# Patient Record
Sex: FEMALE | Race: ASIAN
[De-identification: names, ages, dates, MRNs, and addresses within clinical notes are randomized per-mention and may not be internally consistent; named-entity substitution may affect disease eponyms.]

---

## 2018-06-01 ENCOUNTER — HOSPITAL ENCOUNTER (EMERGENCY)
Dept: HOSPITAL 76 - ED | Age: 53
Discharge: HOME | End: 2018-06-01
Payer: COMMERCIAL

## 2018-06-01 VITALS — DIASTOLIC BLOOD PRESSURE: 84 MMHG | SYSTOLIC BLOOD PRESSURE: 128 MMHG

## 2018-06-01 DIAGNOSIS — R73.9: Primary | ICD-10-CM

## 2018-06-01 DIAGNOSIS — I10: ICD-10-CM

## 2018-06-01 DIAGNOSIS — N30.00: ICD-10-CM

## 2018-06-01 LAB
ALBUMIN DIAFP-MCNC: 4.2 G/DL (ref 3.2–5.5)
ALBUMIN/GLOB SERPL: 1.1 {RATIO} (ref 1–2.2)
ALP SERPL-CCNC: 86 IU/L (ref 42–121)
ALT SERPL W P-5'-P-CCNC: 30 IU/L (ref 10–60)
ANION GAP SERPL CALCULATED.4IONS-SCNC: 8 MMOL/L (ref 6–13)
AST SERPL W P-5'-P-CCNC: 32 IU/L (ref 10–42)
BASOPHILS NFR BLD AUTO: 0 10^3/UL (ref 0–0.1)
BASOPHILS NFR BLD AUTO: 0.8 %
BILIRUB BLD-MCNC: 0.9 MG/DL (ref 0.2–1)
BUN SERPL-MCNC: 8 MG/DL (ref 6–20)
CALCIUM UR-MCNC: 8.9 MG/DL (ref 8.5–10.3)
CHLORIDE SERPL-SCNC: 100 MMOL/L (ref 101–111)
CLARITY UR REFRACT.AUTO: CLEAR
CO2 SERPL-SCNC: 26 MMOL/L (ref 21–32)
CREAT SERPLBLD-SCNC: 0.7 MG/DL (ref 0.4–1)
EOSINOPHIL # BLD AUTO: 0.1 10^3/UL (ref 0–0.7)
EOSINOPHIL NFR BLD AUTO: 2.1 %
ERYTHROCYTE [DISTWIDTH] IN BLOOD BY AUTOMATED COUNT: 15.8 % (ref 12–15)
EST. AVERAGE GLUCOSE BLD GHB EST-MCNC: 148 MG/DL (ref 70–100)
GFRSERPLBLD MDRD-ARVRAT: 88 ML/MIN/{1.73_M2} (ref 89–?)
GLOBULIN SER-MCNC: 3.8 G/DL (ref 2.1–4.2)
GLUCOSE SERPL-MCNC: 247 MG/DL (ref 70–100)
GLUCOSE UR QL STRIP.AUTO: NEGATIVE MG/DL
HB2 TOTAL: 13.5 G/DL
HBA1C BLD-MCNC: 0.68 G/DL
HEMOGLOBIN A1C %: 6.8 % (ref 4.6–6.2)
HGB UR QL STRIP: 11.9 G/DL (ref 12–16)
KETONES UR QL STRIP.AUTO: NEGATIVE MG/DL
LIPASE SERPL-CCNC: 34 U/L (ref 22–51)
LYMPHOCYTES # SPEC AUTO: 1.6 10^3/UL (ref 1.5–3.5)
LYMPHOCYTES NFR BLD AUTO: 33.6 %
MCH RBC QN AUTO: 21.1 PG (ref 27–31)
MCHC RBC AUTO-ENTMCNC: 31.8 G/DL (ref 32–36)
MCV RBC AUTO: 66.5 FL (ref 81–99)
MONOCYTES # BLD AUTO: 0.3 10^3/UL (ref 0–1)
MONOCYTES NFR BLD AUTO: 6.9 %
NEUTROPHILS # BLD AUTO: 2.7 10^3/UL (ref 1.5–6.6)
NEUTROPHILS # SNV AUTO: 4.8 X10^3/UL (ref 4.8–10.8)
NEUTROPHILS NFR BLD AUTO: 56.6 %
NITRITE UR QL STRIP.AUTO: NEGATIVE
PDW BLD AUTO: 7.6 FL (ref 7.9–10.8)
PH UR STRIP.AUTO: 6.5 PH (ref 5–7.5)
PLAT MORPH BLD: (no result)
PLATELET # BLD: 225 10^3/UL (ref 130–450)
PLATELET BLD QL SMEAR: (no result)
PROT SPEC-MCNC: 8 G/DL (ref 6.7–8.2)
PROT UR STRIP.AUTO-MCNC: NEGATIVE MG/DL
RBC # UR STRIP.AUTO: NEGATIVE /UL
RBC # URNS HPF: (no result) /HPF (ref 0–5)
RBC MAR: 5.63 10^6/UL (ref 4.2–5.4)
RBC MORPH BLD: (no result)
SODIUM SERPLBLD-SCNC: 134 MMOL/L (ref 135–145)
SP GR UR STRIP.AUTO: <=1.005 (ref 1–1.03)
SQUAMOUS URNS QL MICRO: (no result)
UROBILINOGEN UR QL STRIP.AUTO: (no result) E.U./DL
UROBILINOGEN UR STRIP.AUTO-MCNC: NEGATIVE MG/DL

## 2018-06-01 PROCEDURE — 36415 COLL VENOUS BLD VENIPUNCTURE: CPT

## 2018-06-01 PROCEDURE — 93005 ELECTROCARDIOGRAM TRACING: CPT

## 2018-06-01 PROCEDURE — 87086 URINE CULTURE/COLONY COUNT: CPT

## 2018-06-01 PROCEDURE — 83690 ASSAY OF LIPASE: CPT

## 2018-06-01 PROCEDURE — 96360 HYDRATION IV INFUSION INIT: CPT

## 2018-06-01 PROCEDURE — 99284 EMERGENCY DEPT VISIT MOD MDM: CPT

## 2018-06-01 PROCEDURE — 81001 URINALYSIS AUTO W/SCOPE: CPT

## 2018-06-01 PROCEDURE — 80053 COMPREHEN METABOLIC PANEL: CPT

## 2018-06-01 PROCEDURE — 85025 COMPLETE CBC W/AUTO DIFF WBC: CPT

## 2018-06-01 PROCEDURE — 81003 URINALYSIS AUTO W/O SCOPE: CPT

## 2018-06-01 PROCEDURE — 84484 ASSAY OF TROPONIN QUANT: CPT

## 2018-06-01 PROCEDURE — 83036 HEMOGLOBIN GLYCOSYLATED A1C: CPT

## 2018-06-01 NOTE — ED PHYSICIAN DOCUMENTATION
History of Present Illness





- Stated complaint


Stated Complaint: DIZZY





- Chief complaint


Chief Complaint: Cardiac





- History obtained from


History obtained from: Patient, Family





- History of Present Illness


Timing: Today


Pain level max: 0


Pain level now: 0


Improved by: rest


Worsened by: standing, walking





- Additonal information


Additional information: 





Patient is a 52-year-old female who presents to the emergency department 

stating she "just does not feel well".  States that she may have had a little 

bit of chest pain earlier today, but is not sure about this.  She states that 

she feels lightheaded like she is going to pass out when she stands up.  She 

denies any current chest pain.  No abdominal pain.  States that her legs feel 

weak when she walks.  She denies any vaginal bleeding.  States that she thinks 

once or twice she has seen a small amount of blood in her stool, but is unsure 

if this is happened recently or not.  She states that she does have a history 

of high blood pressure but has not seen a doctor for several years.  Currently 

does not take any medications at home.  States decreased appetite lately.





Review of Systems


Ten Systems: 10 systems reviewed and negative


Constitutional: denies: Fever, Chills


Ears: denies: Ear pain


Nose: denies: Rhinorrhea / runny nose, Congestion


Throat: denies: Sore throat


Respiratory: denies: Dyspnea, Cough, Wheezing


GI: denies: Abdominal Pain, Nausea, Vomiting, Diarrhea


Skin: denies: Rash


Musculoskeletal: denies: Neck pain, Back pain


Neurologic: denies: Headache





PD PAST MEDICAL HISTORY





- Past Medical History


Past Medical History: Yes


Cardiovascular: Hypertension





- Present Medications


Home Medications: 


 Ambulatory Orders











 Medication  Instructions  Recorded  Confirmed


 


Cephalexin [Keflex] 500 mg PO Q6H #20 capsule 06/01/18 


 


Ferrous Sulfate 325 mg PO DAILY #30 tablet 06/01/18 


 


Metformin HCl 500 mg PO BID #60 tablet 06/01/18 














- Allergies


Allergies/Adverse Reactions: 


 Allergies











Allergy/AdvReac Type Severity Reaction Status Date / Time


 


No Known Drug Allergies Allergy   Verified 06/01/18 13:23














PD ED PE NORMAL





- Vitals


Vital signs reviewed: Yes





- General


General: Alert and oriented X 3, No acute distress, Well developed/nourished





- HEENT


HEENT: PERRL, Other (dry lips)





- Neck


Neck: Supple, no meningeal sign





- Cardiac


Cardiac: RRR, Strong equal pulses





- Respiratory


Respiratory: No respiratory distress, Clear bilaterally





- Abdomen


Abdomen: Normal bowel sounds, Soft, Non tender, Non distended





- Derm


Derm: Warm and dry, No rash





- Extremities


Extremities: No edema, No calf tenderness / cord





- Neuro


Neuro: Alert and oriented X 3





- Psych


Psych: Normal mood, Normal affect





Results





- Vitals


Vitals: 


 Vital Signs - 24 hr











  06/01/18 06/01/18 06/01/18





  13:22 14:48 15:35


 


Temperature 36.6 C  36 C L


 


Heart Rate 98 84 84


 


Respiratory 18 14 16





Rate   


 


Blood Pressure 162/104 H 144/84 H 128/84 H


 


O2 Saturation 100 98 99








 Oxygen











O2 Source                      Room air

















- Labs


Labs: 


 Laboratory Tests











  06/01/18 06/01/18 06/01/18





  13:35 13:35 13:35


 


WBC  4.8  


 


RBC  5.63 H  


 


Hgb  11.9 L  


 


Hct  37.4  


 


MCV  66.5 L  


 


MCH  21.1 L  


 


MCHC  31.8 L  


 


RDW  15.8 H  


 


Plt Count  225  


 


MPV  7.6 L  


 


Neut # (Auto)  2.7  


 


Lymph # (Auto)  1.6  


 


Mono # (Auto)  0.3  


 


Eos # (Auto)  0.1  


 


Baso # (Auto)  0.0  


 


Absolute Nucleated RBC  0.00  


 


Nucleated RBC %  0.1  


 


Manual Slide Review  Indicated  


 


Platelet Estimate  NORMAL (130-450,000)  


 


Platelet Morphology  NORMAL APPEARANCE  


 


RBC Morph Micro Appear  1+ POIKILOCYTOSIS  


 


Sodium   134 L 


 


Potassium   3.3 L 


 


Chloride   100 L 


 


Carbon Dioxide   26 


 


Anion Gap   8.0 


 


BUN   8 


 


Creatinine   0.7 


 


Estimated GFR (MDRD)   88 L 


 


Glucose   247 H 


 


Glycated Hemoglobin   


 


Estim Average Glucose   


 


Calcium   8.9 


 


Total Bilirubin   0.9 


 


AST   32 


 


ALT   30 


 


Alkaline Phosphatase   86 


 


Troponin I    < 0.04


 


Total Protein   8.0 


 


Albumin   4.2 


 


Globulin   3.8 


 


Albumin/Globulin Ratio   1.1 


 


Lipase   34 


 


Urine Color   


 


Urine Clarity   


 


Urine pH   


 


Ur Specific Gravity   


 


Urine Protein   


 


Urine Glucose (UA)   


 


Urine Ketones   


 


Urine Occult Blood   


 


Urine Nitrite   


 


Urine Bilirubin   


 


Urine Urobilinogen   


 


Ur Leukocyte Esterase   


 


Urine RBC   


 


Urine WBC   


 


Ur Squamous Epith Cells   


 


Urine Bacteria   


 


Ur Microscopic Review   


 


Urine Culture Comments   














  06/01/18 06/01/18





  13:35 14:40


 


WBC  


 


RBC  


 


Hgb  


 


Hct  


 


MCV  


 


MCH  


 


MCHC  


 


RDW  


 


Plt Count  


 


MPV  


 


Neut # (Auto)  


 


Lymph # (Auto)  


 


Mono # (Auto)  


 


Eos # (Auto)  


 


Baso # (Auto)  


 


Absolute Nucleated RBC  


 


Nucleated RBC %  


 


Manual Slide Review  


 


Platelet Estimate  


 


Platelet Morphology  


 


RBC Morph Micro Appear  


 


Sodium  


 


Potassium  


 


Chloride  


 


Carbon Dioxide  


 


Anion Gap  


 


BUN  


 


Creatinine  


 


Estimated GFR (MDRD)  


 


Glucose  


 


Glycated Hemoglobin  6.8 H 


 


Estim Average Glucose  148 H 


 


Calcium  


 


Total Bilirubin  


 


AST  


 


ALT  


 


Alkaline Phosphatase  


 


Troponin I  


 


Total Protein  


 


Albumin  


 


Globulin  


 


Albumin/Globulin Ratio  


 


Lipase  


 


Urine Color   YELLOW


 


Urine Clarity   CLEAR


 


Urine pH   6.5


 


Ur Specific Gravity   <=1.005


 


Urine Protein   NEGATIVE


 


Urine Glucose (UA)   NEGATIVE


 


Urine Ketones   NEGATIVE


 


Urine Occult Blood   NEGATIVE


 


Urine Nitrite   NEGATIVE


 


Urine Bilirubin   NEGATIVE


 


Urine Urobilinogen   0.2 (NORMAL)


 


Ur Leukocyte Esterase   SMALL H


 


Urine RBC   0-5


 


Urine WBC   6-10 H


 


Ur Squamous Epith Cells   RARE Squamous


 


Urine Bacteria   None Seen


 


Ur Microscopic Review   INDICATED


 


Urine Culture Comments   INDICATED














PD MEDICAL DECISION MAKING





- ED course


Complexity details: reviewed results, re-evaluated patient, considered 

differential, d/w patient, d/w family


ED course: 





Patient is a 52-year-old female who presents to the emergency department the 

complaint of not feeling well.  Feels better after IV fluids.  Appears to be 

diabetic and will start her on metformin.  She has not been told this before.  

She also appears to have a microcytic anemia, likely iron deficiency and will 

start her on iron.  She also has high blood pressure, will have her follow-up 

with her doctor determine if she has hypertension.  She is well-appearing, 

nontoxic.  Afebrile.  Ambulating with a steady gait.  No evidence of stroke. 

Also appears to have a UTI will place on antibiotics for this.  Patient 

counseled regarding signs and symptoms for which I believe and urgent re-

evaluation would be necessary. Patient with good understanding of and agreement 

to plan and is comfortable going home at this time





This document was made in part using voice recognition software. While efforts 

are made to proofread this document, sound alike and grammatical errors may 

occur.





Departure





- Departure


Disposition: 01 Home, Self Care


Clinical Impression: 


 Hyperglycemia





UTI (urinary tract infection)


Qualifiers:


 Urinary tract infection type: acute cystitis Hematuria presence: without 

hematuria Qualified Code(s): N30.00 - Acute cystitis without hematuria





Condition: Good


Instructions:  ED UTI Cystitis Female, ED Hyperglycemia New Susp Diabetes


Follow-Up: 


Joss Hylton MD [Primary Care Provider] - Within 1 week


Prescriptions: 


Cephalexin [Keflex] 500 mg PO Q6H #20 capsule


Ferrous Sulfate 325 mg PO DAILY #30 tablet


Metformin HCl 500 mg PO BID #60 tablet


Comments: 


Your blood sugar is elevated today and I suspect that you have diabetes.  You 

also appear to have high blood pressure, but this should be rechecked with your 

doctor.  We will start you on medication to help control your blood sugar.  You 

are also likely iron deficient and we will start you on iron as well.  Return 

if you worsen.


Discharge Date/Time: 06/01/18 15:42

## 2018-07-02 ENCOUNTER — HOSPITAL ENCOUNTER (OUTPATIENT)
Dept: HOSPITAL 76 - LAB.WCP | Age: 53
Discharge: HOME | End: 2018-07-02
Attending: PHYSICIAN ASSISTANT
Payer: COMMERCIAL

## 2018-07-02 DIAGNOSIS — E03.9: Primary | ICD-10-CM

## 2018-07-02 LAB
T4 FREE SERPL-MCNC: 0.49 NG/DL (ref 0.58–1.64)
TSH SERPL-ACNC: 13.74 UIU/ML (ref 0.34–5.6)

## 2018-07-02 PROCEDURE — 84439 ASSAY OF FREE THYROXINE: CPT

## 2018-07-02 PROCEDURE — 84443 ASSAY THYROID STIM HORMONE: CPT

## 2018-07-02 PROCEDURE — 36415 COLL VENOUS BLD VENIPUNCTURE: CPT

## 2018-08-08 ENCOUNTER — HOSPITAL ENCOUNTER (OUTPATIENT)
Dept: HOSPITAL 76 - LAB.WCP | Age: 53
Discharge: HOME | End: 2018-08-08
Attending: PHYSICIAN ASSISTANT
Payer: COMMERCIAL

## 2018-08-08 ENCOUNTER — HOSPITAL ENCOUNTER (OUTPATIENT)
Dept: HOSPITAL 76 - DI | Age: 53
Discharge: HOME | End: 2018-08-08
Attending: PHYSICIAN ASSISTANT
Payer: COMMERCIAL

## 2018-08-08 DIAGNOSIS — R06.09: Primary | ICD-10-CM

## 2018-08-08 DIAGNOSIS — E11.9: ICD-10-CM

## 2018-08-08 DIAGNOSIS — I10: ICD-10-CM

## 2018-08-08 DIAGNOSIS — E03.9: Primary | ICD-10-CM

## 2018-08-08 LAB
T4 FREE SERPL-MCNC: 1.03 NG/DL (ref 0.58–1.64)
TSH SERPL-ACNC: 2.32 UIU/ML (ref 0.34–5.6)

## 2018-08-08 PROCEDURE — 36415 COLL VENOUS BLD VENIPUNCTURE: CPT

## 2018-08-08 PROCEDURE — 84439 ASSAY OF FREE THYROXINE: CPT

## 2018-08-08 PROCEDURE — 84443 ASSAY THYROID STIM HORMONE: CPT

## 2018-08-08 PROCEDURE — 93306 TTE W/DOPPLER COMPLETE: CPT

## 2019-03-29 ENCOUNTER — HOSPITAL ENCOUNTER (OUTPATIENT)
Dept: HOSPITAL 76 - LAB.WCP | Age: 54
Discharge: HOME | End: 2019-03-29
Attending: PHYSICIAN ASSISTANT
Payer: COMMERCIAL

## 2019-03-29 DIAGNOSIS — E03.9: ICD-10-CM

## 2019-03-29 DIAGNOSIS — I10: ICD-10-CM

## 2019-03-29 DIAGNOSIS — E11.9: Primary | ICD-10-CM

## 2019-03-29 LAB
ALBUMIN DIAFP-MCNC: 4.1 G/DL (ref 3.2–5.5)
ALBUMIN/GLOB SERPL: 1.4 {RATIO} (ref 1–2.2)
ALP SERPL-CCNC: 71 IU/L (ref 42–121)
ALT SERPL W P-5'-P-CCNC: 39 IU/L (ref 10–60)
ANION GAP SERPL CALCULATED.4IONS-SCNC: 8 MMOL/L (ref 6–13)
AST SERPL W P-5'-P-CCNC: 36 IU/L (ref 10–42)
BASOPHILS NFR BLD AUTO: 0 10^3/UL (ref 0–0.1)
BASOPHILS NFR BLD AUTO: 0.7 %
BILIRUB BLD-MCNC: 0.7 MG/DL (ref 0.2–1)
BUN SERPL-MCNC: 12 MG/DL (ref 6–20)
CALCIUM UR-MCNC: 9.4 MG/DL (ref 8.5–10.3)
CHLORIDE SERPL-SCNC: 102 MMOL/L (ref 101–111)
CO2 SERPL-SCNC: 28 MMOL/L (ref 21–32)
CREAT SERPLBLD-SCNC: 0.6 MG/DL (ref 0.4–1)
EOSINOPHIL # BLD AUTO: 0.1 10^3/UL (ref 0–0.7)
EOSINOPHIL NFR BLD AUTO: 2.6 %
ERYTHROCYTE [DISTWIDTH] IN BLOOD BY AUTOMATED COUNT: 15.9 % (ref 12–15)
EST. AVERAGE GLUCOSE BLD GHB EST-MCNC: 128 MG/DL (ref 70–100)
GFRSERPLBLD MDRD-ARVRAT: 105 ML/MIN/{1.73_M2} (ref 89–?)
GLOBULIN SER-MCNC: 3 G/DL (ref 2.1–4.2)
GLUCOSE SERPL-MCNC: 105 MG/DL (ref 70–100)
HB2 TOTAL: 12.6 G/DL
HBA1C BLD-MCNC: 0.54 G/DL
HEMOGLOBIN A1C %: 6.1 % (ref 4.6–6.2)
HGB UR QL STRIP: 11.5 G/DL (ref 12–16)
LYMPHOCYTES # SPEC AUTO: 1.4 10^3/UL (ref 1.5–3.5)
LYMPHOCYTES NFR BLD AUTO: 27.4 %
MCH RBC QN AUTO: 20.8 PG (ref 27–31)
MCHC RBC AUTO-ENTMCNC: 31.5 G/DL (ref 32–36)
MCV RBC AUTO: 66.2 FL (ref 81–99)
MONOCYTES # BLD AUTO: 0.4 10^3/UL (ref 0–1)
MONOCYTES NFR BLD AUTO: 7.8 %
NEUTROPHILS # BLD AUTO: 3 10^3/UL (ref 1.5–6.6)
NEUTROPHILS # SNV AUTO: 4.9 X10^3/UL (ref 4.8–10.8)
NEUTROPHILS NFR BLD AUTO: 61.5 %
PDW BLD AUTO: 8.2 FL (ref 7.9–10.8)
PLAT MORPH BLD: (no result)
PLATELET # BLD: 196 10^3/UL (ref 130–450)
PLATELET BLD QL SMEAR: (no result)
PROT SPEC-MCNC: 7.1 G/DL (ref 6.7–8.2)
RBC MAR: 5.51 10^6/UL (ref 4.2–5.4)
RBC MORPH BLD: (no result)
SODIUM SERPLBLD-SCNC: 138 MMOL/L (ref 135–145)
T4 FREE SERPL-MCNC: 1.15 NG/DL (ref 0.58–1.64)

## 2019-03-29 PROCEDURE — 84443 ASSAY THYROID STIM HORMONE: CPT

## 2019-03-29 PROCEDURE — 84439 ASSAY OF FREE THYROXINE: CPT

## 2019-03-29 PROCEDURE — 36415 COLL VENOUS BLD VENIPUNCTURE: CPT

## 2019-03-29 PROCEDURE — 80053 COMPREHEN METABOLIC PANEL: CPT

## 2019-03-29 PROCEDURE — 83036 HEMOGLOBIN GLYCOSYLATED A1C: CPT

## 2019-03-29 PROCEDURE — 85025 COMPLETE CBC W/AUTO DIFF WBC: CPT

## 2019-07-03 ENCOUNTER — HOSPITAL ENCOUNTER (OUTPATIENT)
Dept: HOSPITAL 76 - LAB.WCP | Age: 54
Discharge: HOME | End: 2019-07-03
Attending: PHYSICIAN ASSISTANT
Payer: COMMERCIAL

## 2019-07-03 DIAGNOSIS — E11.9: ICD-10-CM

## 2019-07-03 DIAGNOSIS — I10: Primary | ICD-10-CM

## 2019-07-03 DIAGNOSIS — E03.9: ICD-10-CM

## 2019-07-03 DIAGNOSIS — Z13.220: ICD-10-CM

## 2019-07-03 LAB
ALBUMIN DIAFP-MCNC: 4 G/DL (ref 3.2–5.5)
ALBUMIN/GLOB SERPL: 1.1 {RATIO} (ref 1–2.2)
ALP SERPL-CCNC: 93 IU/L (ref 42–121)
ALT SERPL W P-5'-P-CCNC: 46 IU/L (ref 10–60)
ANION GAP SERPL CALCULATED.4IONS-SCNC: 10 MMOL/L (ref 6–13)
AST SERPL W P-5'-P-CCNC: 41 IU/L (ref 10–42)
BASOPHILS NFR BLD AUTO: 0 10^3/UL (ref 0–0.1)
BASOPHILS NFR BLD AUTO: 0.9 %
BILIRUB BLD-MCNC: 0.6 MG/DL (ref 0.2–1)
BUN SERPL-MCNC: 12 MG/DL (ref 6–20)
CALCIUM UR-MCNC: 9.2 MG/DL (ref 8.5–10.3)
CHLORIDE SERPL-SCNC: 106 MMOL/L (ref 101–111)
CHOLEST SERPL-MCNC: 202 MG/DL
CO2 SERPL-SCNC: 25 MMOL/L (ref 21–32)
CREAT SERPLBLD-SCNC: 0.5 MG/DL (ref 0.4–1)
EOSINOPHIL # BLD AUTO: 0.1 10^3/UL (ref 0–0.7)
EOSINOPHIL NFR BLD AUTO: 2.6 %
ERYTHROCYTE [DISTWIDTH] IN BLOOD BY AUTOMATED COUNT: 15.2 % (ref 12–15)
EST. AVERAGE GLUCOSE BLD GHB EST-MCNC: 128 MG/DL (ref 70–100)
GFRSERPLBLD MDRD-ARVRAT: 130 ML/MIN/{1.73_M2} (ref 89–?)
GLOBULIN SER-MCNC: 3.5 G/DL (ref 2.1–4.2)
GLUCOSE SERPL-MCNC: 97 MG/DL (ref 70–100)
HB2 TOTAL: 12.3 G/DL
HBA1C BLD-MCNC: 0.53 G/DL
HDLC SERPL-MCNC: 86 MG/DL
HDLC SERPL: 2.3 {RATIO} (ref ?–4.4)
HEMOGLOBIN A1C %: 6.1 % (ref 4.6–6.2)
HGB UR QL STRIP: 11.3 G/DL (ref 12–16)
LYMPHOCYTES # SPEC AUTO: 1.5 10^3/UL (ref 1.5–3.5)
LYMPHOCYTES NFR BLD AUTO: 42.2 %
MCH RBC QN AUTO: 20.1 PG (ref 27–31)
MCHC RBC AUTO-ENTMCNC: 30.1 G/DL (ref 32–36)
MCV RBC AUTO: 66.8 FL (ref 81–99)
MONOCYTES # BLD AUTO: 0.3 10^3/UL (ref 0–1)
MONOCYTES NFR BLD AUTO: 9.2 %
NEUTROPHILS # BLD AUTO: 1.6 10^3/UL (ref 1.5–6.6)
NEUTROPHILS # SNV AUTO: 3.5 X10^3/UL (ref 4.8–10.8)
NEUTROPHILS NFR BLD AUTO: 44.8 %
PDW BLD AUTO: 9.4 FL (ref 7.9–10.8)
PLATELET # BLD: 263 10^3/UL (ref 130–450)
PROT SPEC-MCNC: 7.5 G/DL (ref 6.7–8.2)
RBC MAR: 5.63 10^6/UL (ref 4.2–5.4)
SODIUM SERPLBLD-SCNC: 141 MMOL/L (ref 135–145)
T4 FREE SERPL-MCNC: 1.43 NG/DL (ref 0.58–1.64)

## 2019-07-03 PROCEDURE — 36415 COLL VENOUS BLD VENIPUNCTURE: CPT

## 2019-07-03 PROCEDURE — 80061 LIPID PANEL: CPT

## 2019-07-03 PROCEDURE — 84443 ASSAY THYROID STIM HORMONE: CPT

## 2019-07-03 PROCEDURE — 80053 COMPREHEN METABOLIC PANEL: CPT

## 2019-07-03 PROCEDURE — 83721 ASSAY OF BLOOD LIPOPROTEIN: CPT

## 2019-07-03 PROCEDURE — 83036 HEMOGLOBIN GLYCOSYLATED A1C: CPT

## 2019-07-03 PROCEDURE — 84439 ASSAY OF FREE THYROXINE: CPT

## 2019-07-03 PROCEDURE — 85025 COMPLETE CBC W/AUTO DIFF WBC: CPT

## 2019-07-15 ENCOUNTER — HOSPITAL ENCOUNTER (OUTPATIENT)
Dept: HOSPITAL 76 - LAB.WCP | Age: 54
Discharge: HOME | End: 2019-07-15
Attending: PHYSICIAN ASSISTANT
Payer: COMMERCIAL

## 2019-07-15 DIAGNOSIS — D64.9: Primary | ICD-10-CM

## 2019-07-15 DIAGNOSIS — E11.9: ICD-10-CM

## 2019-07-15 LAB
IRON SATN MFR SERPL: 21 % (ref 20–50)
IRON SERPL-MCNC: 64 UG/DL (ref 28–170)
TIBC SERPL-MCNC: 302 UG/DL (ref 250–450)
TRANSFERRIN SERPL-MCNC: 216 MG/DL (ref 192–382)

## 2019-07-15 PROCEDURE — 82607 VITAMIN B-12: CPT

## 2019-07-15 PROCEDURE — 84466 ASSAY OF TRANSFERRIN: CPT

## 2019-07-15 PROCEDURE — 83540 ASSAY OF IRON: CPT

## 2019-07-15 PROCEDURE — 36415 COLL VENOUS BLD VENIPUNCTURE: CPT

## 2019-09-18 ENCOUNTER — HOSPITAL ENCOUNTER (OUTPATIENT)
Dept: HOSPITAL 76 - LAB.WCP | Age: 54
Discharge: HOME | End: 2019-09-18
Attending: PHYSICIAN ASSISTANT
Payer: COMMERCIAL

## 2019-09-18 DIAGNOSIS — E03.9: Primary | ICD-10-CM

## 2019-09-18 DIAGNOSIS — D72.819: ICD-10-CM

## 2019-09-18 LAB
BASOPHILS NFR BLD AUTO: 0 10^3/UL (ref 0–0.1)
BASOPHILS NFR BLD AUTO: 0.9 %
EOSINOPHIL # BLD AUTO: 0.2 10^3/UL (ref 0–0.7)
EOSINOPHIL NFR BLD AUTO: 3.4 %
ERYTHROCYTE [DISTWIDTH] IN BLOOD BY AUTOMATED COUNT: 16.2 % (ref 12–15)
HGB UR QL STRIP: 11.7 G/DL (ref 12–16)
LYMPHOCYTES # SPEC AUTO: 2 10^3/UL (ref 1.5–3.5)
LYMPHOCYTES NFR BLD AUTO: 42.7 %
MCH RBC QN AUTO: 20.8 PG (ref 27–31)
MCHC RBC AUTO-ENTMCNC: 31.5 G/DL (ref 32–36)
MCV RBC AUTO: 66 FL (ref 81–99)
MONOCYTES # BLD AUTO: 0.5 10^3/UL (ref 0–1)
MONOCYTES NFR BLD AUTO: 9.6 %
NEUTROPHILS # BLD AUTO: 2 10^3/UL (ref 1.5–6.6)
NEUTROPHILS # SNV AUTO: 4.7 X10^3/UL (ref 4.8–10.8)
NEUTROPHILS NFR BLD AUTO: 43.4 %
PDW BLD AUTO: 9.8 FL (ref 7.9–10.8)
PLATELET # BLD: 198 10^3/UL (ref 130–450)
RBC MAR: 5.62 10^6/UL (ref 4.2–5.4)

## 2019-09-18 PROCEDURE — 84443 ASSAY THYROID STIM HORMONE: CPT

## 2019-09-18 PROCEDURE — 36415 COLL VENOUS BLD VENIPUNCTURE: CPT

## 2019-09-18 PROCEDURE — 85025 COMPLETE CBC W/AUTO DIFF WBC: CPT

## 2022-03-24 ENCOUNTER — HOSPITAL ENCOUNTER (OUTPATIENT)
Dept: HOSPITAL 76 - DI.N | Age: 57
Discharge: HOME | End: 2022-03-24
Attending: PHYSICIAN ASSISTANT
Payer: COMMERCIAL

## 2022-03-24 DIAGNOSIS — Z12.31: Primary | ICD-10-CM

## 2022-03-25 NOTE — MAMMOGRAPHY REPORT
BILATERAL DIGITAL SCREENING MAMMOGRAM 3D/2D: 3/24/2022

 

CLINICAL: Routine screening.  

 

Comparison is made to exam dated:  9/25/2009 mammogram - [facility].  The tissue of
 both breasts is heterogeneously dense. This may lower the sensitivity of mammography.  

 

No significant masses, calcifications, or other findings are seen in either breast.  

There has been no significant interval change.

 

IMPRESSION: NEGATIVE

There is no mammographic evidence of malignancy. A 1 year screening mammogram is recommended.  

 

 

This exam was interpreted at Station ID: [id number].  

 

NOTE: For mammograms, a report in lay terms will be sent to the patient. Approximately 15% of breast 
malignancies will not be visualized mammographically. In the management of a palpable breast mass, a 
negative mammogram must not discourage biopsy of a clinically suspicious lesion.

 

Electronically Signed By: Chicho Garcia M.D.          

slc/penrad:3/24/2022 13:32:23  

 

 

 

ACR BI-RADS Category 1: Negative [id number]

PARENCHYMAL PATTERN: (D) - The breast(s) demonstrate(s) heterogeneously dense fibroglandular martin jessica.

BI-RADS CATEGORY: (1) - 1

RECOMMENDATION: (ANNUAL)  - Recommend routine annual screening mammography.

[id number]

1 year screening

LATERALITY: (B)